# Patient Record
(demographics unavailable — no encounter records)

---

## 2024-12-05 NOTE — ASSESSMENT
[FreeTextEntry1] : She has lower abdominal pain which has resolved.  CT showed possible mild intestinal inflammation.  Consider a viral process.  Symptoms have resolved so will monitor.    She now has a cough and URI symptoms.  It is likely viral but she has a slight wheeze and persistent symptoms.  Will treat with a Z-binu. She can complete the Medrol and use OTC antitussives.  Call PRN.  Check a nasal swab for COVID-19, influenza and RSV.  Alk phos in the ER stable at 163.  She didn't do the follow-up chest CT, mammogram or breast U/S which had been advised.  It was again advised and she agrees.

## 2024-12-05 NOTE — HISTORY OF PRESENT ILLNESS
[de-identified] : The patient had acute lower abdominal pain to the lower back starting a week ago.  She was concerned about pancreatitis so she went to the [facility] ED.  A CT A/P showed possible mild intestinal inflammation.  Blood tests showed a WBC 11.4, alk phos 163 (old), o/w metabolic and lipase fine.  She was discharged home.  The abdominal pain has resolved.  No N/V, diarrhea or other GI symptoms.  She then developeda cough which was wet, no yellow phlegm.  She had myalgias, felt ill, possible AM dyspnea, chest pain with cough.  No fever, sinus pain. She went to an [facility] and a COVID-19 and strep test was negative.  She was prescribed Benzonatate which hasn't helped.  She was also given a Medrol Dosepack which might be helping.  She still has a cough which is more dry and she has fatigue.

## 2024-12-05 NOTE — PHYSICAL EXAM
[Normal] : soft, non-tender, non-distended, no masses palpated, no HSM and normal bowel sounds [de-identified] : slight expiratory wheezes